# Patient Record
Sex: FEMALE | Race: WHITE | NOT HISPANIC OR LATINO | URBAN - METROPOLITAN AREA
[De-identification: names, ages, dates, MRNs, and addresses within clinical notes are randomized per-mention and may not be internally consistent; named-entity substitution may affect disease eponyms.]

---

## 2022-07-25 ENCOUNTER — APPOINTMENT (RX ONLY)
Dept: URBAN - METROPOLITAN AREA CLINIC 42 | Facility: CLINIC | Age: 32
Setting detail: DERMATOLOGY
End: 2022-07-25

## 2022-07-25 DIAGNOSIS — L71.8 OTHER ROSACEA: ICD-10-CM

## 2022-07-25 DIAGNOSIS — L91.8 OTHER HYPERTROPHIC DISORDERS OF THE SKIN: ICD-10-CM

## 2022-07-25 PROCEDURE — ? COUNSELING

## 2022-07-25 PROCEDURE — 99203 OFFICE O/P NEW LOW 30 MIN: CPT | Mod: 25

## 2022-07-25 PROCEDURE — ? PRESCRIPTION

## 2022-07-25 PROCEDURE — ? SKIN TAG REMOVAL

## 2022-07-25 PROCEDURE — ? PRESCRIPTION MEDICATION MANAGEMENT

## 2022-07-25 PROCEDURE — 11200 RMVL SKIN TAGS UP TO&INC 15: CPT

## 2022-07-25 ASSESSMENT — LOCATION ZONE DERM
LOCATION ZONE: FACE
LOCATION ZONE: TRUNK

## 2022-07-25 ASSESSMENT — LOCATION SIMPLE DESCRIPTION DERM
LOCATION SIMPLE: RIGHT LOWER BACK
LOCATION SIMPLE: LEFT CHEEK

## 2022-07-25 ASSESSMENT — LOCATION DETAILED DESCRIPTION DERM
LOCATION DETAILED: RIGHT INFERIOR MEDIAL MIDBACK
LOCATION DETAILED: LEFT CENTRAL MALAR CHEEK

## 2022-07-25 NOTE — PROCEDURE: SKIN TAG REMOVAL
Anesthesia Type: 1% lidocaine with epinephrine
Detail Level: Detailed
Anesthesia Volume In Cc: 1
Include Z78.9 (Other Specified Conditions Influencing Health Status) As An Associated Diagnosis?: No
Medical Necessity Clause: This procedure was medically necessary because the lesions that were treated were:
Medical Necessity Information: It is in your best interest to select a reason for this procedure from the list below. All of these items fulfill various CMS LCD requirements except the new and changing color options.
Consent: Written consent obtained and the risks of skin tag removal was reviewed with the patient including but not limited to bleeding, pigmentary change, infection, pain, and remote possibility of scarring.
Add Associated Diagnoses If Applicable When Selecting Medical Necessity: Yes

## 2022-07-25 NOTE — PROCEDURE: PRESCRIPTION MEDICATION MANAGEMENT
Detail Level: Detailed
Initiate Treatment: Start rosacea compound
Render In Strict Bullet Format?: No